# Patient Record
Sex: FEMALE | Race: ASIAN | NOT HISPANIC OR LATINO
[De-identification: names, ages, dates, MRNs, and addresses within clinical notes are randomized per-mention and may not be internally consistent; named-entity substitution may affect disease eponyms.]

---

## 2020-07-26 PROBLEM — Z00.00 ENCOUNTER FOR PREVENTIVE HEALTH EXAMINATION: Status: ACTIVE | Noted: 2020-07-26

## 2020-08-13 ENCOUNTER — TRANSCRIPTION ENCOUNTER (OUTPATIENT)
Age: 55
End: 2020-08-13

## 2020-08-13 ENCOUNTER — OUTPATIENT (OUTPATIENT)
Dept: OUTPATIENT SERVICES | Facility: HOSPITAL | Age: 55
LOS: 1 days | End: 2020-08-13

## 2020-08-13 VITALS
OXYGEN SATURATION: 100 % | WEIGHT: 149.91 LBS | SYSTOLIC BLOOD PRESSURE: 120 MMHG | DIASTOLIC BLOOD PRESSURE: 82 MMHG | HEART RATE: 81 BPM | RESPIRATION RATE: 18 BRPM | HEIGHT: 62 IN | TEMPERATURE: 98 F

## 2020-08-13 DIAGNOSIS — Z98.890 OTHER SPECIFIED POSTPROCEDURAL STATES: Chronic | ICD-10-CM

## 2020-08-13 DIAGNOSIS — N84.0 POLYP OF CORPUS UTERI: ICD-10-CM

## 2020-08-13 RX ORDER — CHLORHEXIDINE GLUCONATE 213 G/1000ML
1 SOLUTION TOPICAL
Qty: 1 | Refills: 0
Start: 2020-08-13

## 2020-08-13 NOTE — H&P PST ADULT - NSANTHOSAYNRD_GEN_A_CORE
No. ELAINA screening performed.  STOP BANG Legend: 0-2 = LOW Risk; 3-4 = INTERMEDIATE Risk; 5-8 = HIGH Risk

## 2020-08-13 NOTE — H&P PST ADULT - HISTORY OF PRESENT ILLNESS
This is a 54 y/o female still menstruating , sonogram revealed + polyp, she scheduled for hysteroscopy and D&C 8/14/2020

## 2020-08-13 NOTE — H&P PST ADULT - NSICDXPASTSURGICALHX_GEN_ALL_CORE_FT
PAST SURGICAL HISTORY:  S/P dilation and curettage PAST SURGICAL HISTORY:  S/P dilation and curettage 2005

## 2020-08-13 NOTE — H&P PST ADULT - NSICDXPROBLEM_GEN_ALL_CORE_FT
PROBLEM DIAGNOSES  Problem: Polyp of corpus uteri  Assessment and Plan: dilation and curettage and hysteroscopy  pre op teaching given  e-prescribe chlorhexidine to pharmacy

## 2020-08-13 NOTE — H&P PST ADULT - PRO ARRIVE FROM
other (specify) telephone interview for history due to corona pandemic, physical to be done day of surgery/other (specify)

## 2020-08-14 ENCOUNTER — TRANSCRIPTION ENCOUNTER (OUTPATIENT)
Age: 55
End: 2020-08-14

## 2020-08-14 ENCOUNTER — RESULT REVIEW (OUTPATIENT)
Age: 55
End: 2020-08-14

## 2020-08-14 ENCOUNTER — OUTPATIENT (OUTPATIENT)
Dept: OUTPATIENT SERVICES | Facility: HOSPITAL | Age: 55
LOS: 1 days | End: 2020-08-14
Payer: COMMERCIAL

## 2020-08-14 VITALS
SYSTOLIC BLOOD PRESSURE: 120 MMHG | TEMPERATURE: 98 F | DIASTOLIC BLOOD PRESSURE: 82 MMHG | HEART RATE: 81 BPM | RESPIRATION RATE: 16 BRPM | WEIGHT: 149.91 LBS | HEIGHT: 62 IN | OXYGEN SATURATION: 100 %

## 2020-08-14 VITALS
HEART RATE: 74 BPM | RESPIRATION RATE: 17 BRPM | TEMPERATURE: 98 F | SYSTOLIC BLOOD PRESSURE: 123 MMHG | OXYGEN SATURATION: 100 % | DIASTOLIC BLOOD PRESSURE: 65 MMHG

## 2020-08-14 DIAGNOSIS — Z98.890 OTHER SPECIFIED POSTPROCEDURAL STATES: Chronic | ICD-10-CM

## 2020-08-14 DIAGNOSIS — N84.0 POLYP OF CORPUS UTERI: ICD-10-CM

## 2020-08-14 LAB
ALLERGY+IMMUNOLOGY DIAG STUDY NOTE: SIGNIFICANT CHANGE UP
ANTIBODY INTERPRETATION 2: SIGNIFICANT CHANGE UP
BLD GP AB SCN SERPL QL: SIGNIFICANT CHANGE UP
HCG UR QL: NEGATIVE — SIGNIFICANT CHANGE UP

## 2020-08-14 PROCEDURE — 86880 COOMBS TEST DIRECT: CPT

## 2020-08-14 PROCEDURE — 36415 COLL VENOUS BLD VENIPUNCTURE: CPT

## 2020-08-14 PROCEDURE — 86900 BLOOD TYPING SEROLOGIC ABO: CPT

## 2020-08-14 PROCEDURE — 86901 BLOOD TYPING SEROLOGIC RH(D): CPT

## 2020-08-14 PROCEDURE — 58558 HYSTEROSCOPY BIOPSY: CPT

## 2020-08-14 PROCEDURE — 88305 TISSUE EXAM BY PATHOLOGIST: CPT

## 2020-08-14 PROCEDURE — 86905 BLOOD TYPING RBC ANTIGENS: CPT

## 2020-08-14 PROCEDURE — 81025 URINE PREGNANCY TEST: CPT

## 2020-08-14 PROCEDURE — 86850 RBC ANTIBODY SCREEN: CPT

## 2020-08-14 PROCEDURE — 88305 TISSUE EXAM BY PATHOLOGIST: CPT | Mod: 26

## 2020-08-14 PROCEDURE — 86870 RBC ANTIBODY IDENTIFICATION: CPT

## 2020-08-14 RX ORDER — SODIUM CHLORIDE 9 MG/ML
3 INJECTION INTRAMUSCULAR; INTRAVENOUS; SUBCUTANEOUS EVERY 8 HOURS
Refills: 0 | Status: DISCONTINUED | OUTPATIENT
Start: 2020-08-14 | End: 2020-08-14

## 2020-08-14 RX ORDER — OXYCODONE AND ACETAMINOPHEN 5; 325 MG/1; MG/1
1 TABLET ORAL EVERY 4 HOURS
Refills: 0 | Status: DISCONTINUED | OUTPATIENT
Start: 2020-08-14 | End: 2020-08-14

## 2020-08-14 RX ORDER — OMEGA-3 ACID ETHYL ESTERS 1 G
1 CAPSULE ORAL
Qty: 0 | Refills: 0 | DISCHARGE

## 2020-08-14 RX ORDER — ONDANSETRON 8 MG/1
4 TABLET, FILM COATED ORAL ONCE
Refills: 0 | Status: DISCONTINUED | OUTPATIENT
Start: 2020-08-14 | End: 2020-08-22

## 2020-08-14 RX ORDER — SODIUM CHLORIDE 9 MG/ML
1000 INJECTION, SOLUTION INTRAVENOUS
Refills: 0 | Status: DISCONTINUED | OUTPATIENT
Start: 2020-08-14 | End: 2020-08-14

## 2020-08-14 RX ORDER — HYDROMORPHONE HYDROCHLORIDE 2 MG/ML
1 INJECTION INTRAMUSCULAR; INTRAVENOUS; SUBCUTANEOUS
Refills: 0 | Status: DISCONTINUED | OUTPATIENT
Start: 2020-08-14 | End: 2020-08-14

## 2020-08-14 RX ORDER — HYDROMORPHONE HYDROCHLORIDE 2 MG/ML
0.5 INJECTION INTRAMUSCULAR; INTRAVENOUS; SUBCUTANEOUS
Refills: 0 | Status: DISCONTINUED | OUTPATIENT
Start: 2020-08-14 | End: 2020-08-14

## 2020-08-14 RX ORDER — IBUPROFEN 200 MG
600 TABLET ORAL ONCE
Refills: 0 | Status: DISCONTINUED | OUTPATIENT
Start: 2020-08-14 | End: 2020-08-22

## 2020-08-14 NOTE — ASU DISCHARGE PLAN (ADULT/PEDIATRIC) - CARE PROVIDER_API CALL
Colette Ramires  OBSTETRICS AND GYNECOLOGY  [id number] [place] [address]  Phone: [phone]  Fax: [phone]  Established Patient  Follow Up Time: 2 weeks

## 2020-08-18 LAB — SURGICAL PATHOLOGY STUDY: SIGNIFICANT CHANGE UP

## 2023-02-13 ENCOUNTER — NON-APPOINTMENT (OUTPATIENT)
Age: 58
End: 2023-02-13

## 2024-04-26 ENCOUNTER — EMERGENCY (EMERGENCY)
Facility: HOSPITAL | Age: 59
LOS: 1 days | Discharge: HOME | End: 2024-04-26
Admitting: EMERGENCY MEDICINE
Payer: COMMERCIAL

## 2024-04-26 VITALS
OXYGEN SATURATION: 99 % | HEART RATE: 77 BPM | DIASTOLIC BLOOD PRESSURE: 86 MMHG | RESPIRATION RATE: 16 BRPM | SYSTOLIC BLOOD PRESSURE: 148 MMHG | TEMPERATURE: 98 F

## 2024-04-26 DIAGNOSIS — Z98.890 OTHER SPECIFIED POSTPROCEDURAL STATES: Chronic | ICD-10-CM

## 2024-04-26 PROBLEM — O03.9 COMPLETE OR UNSPECIFIED SPONTANEOUS ABORTION WITHOUT COMPLICATION: Chronic | Status: ACTIVE | Noted: 2020-08-13

## 2024-04-26 PROCEDURE — 73620 X-RAY EXAM OF FOOT: CPT | Mod: 26,LT

## 2024-04-26 PROCEDURE — 99283 EMERGENCY DEPT VISIT LOW MDM: CPT

## 2024-04-26 NOTE — ED PROVIDER NOTE - OBJECTIVE STATEMENT
60 y/o female w/ no pmh reports today c/o L-foot pain X 2 weeks. Pt reports that prior to sx onset she "rolled  her L-foot on a rug". She is able to ambulate but it is painful. 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 She tried otc medication which helped mildly and walking makes her sx worse. Denies; cp, sob, abdominal pain, dizziness, ha, lbp, visual impairment, photophobia, rash, recent travel, fever, chills, nausea or vomiting .

## 2024-04-26 NOTE — ED PROVIDER NOTE - PROGRESS NOTE DETAILS
xr= fx of 3rd toe   pt has 3rd toe/2nd toe mackenzie taped, put into a hard boot and given crutches   apap/motrin recommended  podiatry referral placed ED return precautions discussed w/ pt  she is able to ambulate on her own using crutches.

## 2024-04-26 NOTE — ED PROVIDER NOTE - EYES NEGATIVE STATEMENT, MLM
Oriented - self; Oriented - place; Oriented - time Initial (On Arrival) no discharge, no irritation, no pain, no redness, and no visual changes.

## 2024-04-26 NOTE — ED PROVIDER NOTE - NSDCPRINTRESULTS_ED_ALL_ED
until cleared by dr camarillo
Patient requests all Lab, Cardiology, and Radiology Results on their Discharge Instructions

## 2024-04-26 NOTE — ED ADULT TRIAGE NOTE - CHIEF COMPLAINT QUOTE
Pt A&OX4 c/o pain in Left foot; banged foot at home 2 weeks ago; c/o pain in Left  3rd toe, forefoot swelling , and pain in ankle  No significant past medical Hx, takes no meds

## 2024-04-26 NOTE — ED PROVIDER NOTE - PATIENT PORTAL LINK FT
You can access the FollowMyHealth Patient Portal offered by [facility] by registering at the following website: [email or web address]. By joining [facility]’s FollowMyHealth portal, you will also be able to view your health information using other applications (apps) compatible with our system.

## 2024-04-26 NOTE — ED PROVIDER NOTE - CLINICAL SUMMARY MEDICAL DECISION MAKING FREE TEXT BOX
58 y/o female w/ no pmh reports today c/o L-foot pain X 2 weeks. Pt reports that prior to sx onset she "rolled  her L-foot on a rug". She is able to ambulate but it is painful. 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 She tried otc medication which helped mildly and walking makes her sx worse    Plan for pt- xr L-foot   offered apap/motrin but pt declined  pt likely will be dc  w/ ortho referral placed.   she is resting comfortably- will reassess.

## 2024-04-26 NOTE — ED PROVIDER NOTE - NSFOLLOWUPINSTRUCTIONS_ED_ALL_ED_FT
use tylenol or motrin if needed for pain  follow  up with podiatry  return to the ED if symptoms worsen as needed.

## 2024-12-27 ENCOUNTER — NON-APPOINTMENT (OUTPATIENT)
Age: 59
End: 2024-12-27